# Patient Record
Sex: MALE | Race: BLACK OR AFRICAN AMERICAN | NOT HISPANIC OR LATINO | URBAN - METROPOLITAN AREA
[De-identification: names, ages, dates, MRNs, and addresses within clinical notes are randomized per-mention and may not be internally consistent; named-entity substitution may affect disease eponyms.]

---

## 2017-02-08 PROBLEM — E78.5 HYPERLIPIDEMIA: Status: ACTIVE | Noted: 2017-02-08

## 2021-10-20 ENCOUNTER — OCCUPATIONAL HEALTH (OUTPATIENT)
Dept: URGENT CARE | Facility: CLINIC | Age: 61
End: 2021-10-20

## 2021-10-20 PROCEDURE — 80305 DRUG TEST PRSMV DIR OPT OBS: CPT | Mod: S$GLB,,, | Performed by: EMERGENCY MEDICINE

## 2021-10-20 PROCEDURE — 80305 PR DRUG SCREEN - 1: ICD-10-PCS | Mod: S$GLB,,, | Performed by: EMERGENCY MEDICINE

## 2023-11-07 ENCOUNTER — TELEPHONE (OUTPATIENT)
Dept: BARIATRICS | Facility: CLINIC | Age: 63
End: 2023-11-07
Payer: COMMERCIAL

## 2023-11-07 NOTE — TELEPHONE ENCOUNTER
Finance has been notified that a patient is requesting a call to further understand why benefits does not cover bariatrics

## 2023-11-07 NOTE — TELEPHONE ENCOUNTER
----- Message from Luis Daniel Thompson sent at 11/7/2023  3:20 PM CST -----  Regarding: Wife                                                           Patient Needs Appointment       Patient: Reji Lee       Seen For:Bariatrics       Date Preferred: Soonest available       Contact Info: .[phone]        Additional Info: Wife would like for the patient to be seen soon. Please reach out to the wife as soon as possible.

## 2025-03-14 DIAGNOSIS — N52.9 MALE ERECTILE DISORDER: Primary | ICD-10-CM

## 2025-03-14 DIAGNOSIS — Z12.5 SCREENING FOR PROSTATE CANCER: ICD-10-CM

## 2025-03-22 ENCOUNTER — OFFICE VISIT (OUTPATIENT)
Dept: UROLOGY | Facility: CLINIC | Age: 65
End: 2025-03-22
Payer: COMMERCIAL

## 2025-03-22 VITALS
WEIGHT: 235.88 LBS | BODY MASS INDEX: 33.77 KG/M2 | SYSTOLIC BLOOD PRESSURE: 135 MMHG | DIASTOLIC BLOOD PRESSURE: 78 MMHG | HEIGHT: 70 IN | HEART RATE: 86 BPM

## 2025-03-22 DIAGNOSIS — R35.1 NOCTURIA: Primary | ICD-10-CM

## 2025-03-22 DIAGNOSIS — Z12.5 SCREENING FOR PROSTATE CANCER: ICD-10-CM

## 2025-03-22 DIAGNOSIS — N52.9 MALE ERECTILE DISORDER: ICD-10-CM

## 2025-03-22 PROCEDURE — 3075F SYST BP GE 130 - 139MM HG: CPT | Mod: CPTII,S$GLB,, | Performed by: STUDENT IN AN ORGANIZED HEALTH CARE EDUCATION/TRAINING PROGRAM

## 2025-03-22 PROCEDURE — 1159F MED LIST DOCD IN RCRD: CPT | Mod: CPTII,S$GLB,, | Performed by: STUDENT IN AN ORGANIZED HEALTH CARE EDUCATION/TRAINING PROGRAM

## 2025-03-22 PROCEDURE — 3008F BODY MASS INDEX DOCD: CPT | Mod: CPTII,S$GLB,, | Performed by: STUDENT IN AN ORGANIZED HEALTH CARE EDUCATION/TRAINING PROGRAM

## 2025-03-22 PROCEDURE — 99999 PR PBB SHADOW E&M-EST. PATIENT-LVL III: CPT | Mod: PBBFAC,,, | Performed by: STUDENT IN AN ORGANIZED HEALTH CARE EDUCATION/TRAINING PROGRAM

## 2025-03-22 PROCEDURE — 3078F DIAST BP <80 MM HG: CPT | Mod: CPTII,S$GLB,, | Performed by: STUDENT IN AN ORGANIZED HEALTH CARE EDUCATION/TRAINING PROGRAM

## 2025-03-22 PROCEDURE — 99204 OFFICE O/P NEW MOD 45 MIN: CPT | Mod: S$GLB,,, | Performed by: STUDENT IN AN ORGANIZED HEALTH CARE EDUCATION/TRAINING PROGRAM

## 2025-03-22 NOTE — PROGRESS NOTES
It was great to see Reji today.    Reji Lee is a pleasant 64 y.o. male presenting for management of ED and PSA surveillence.     History of Present Illness     - Patient, a 64-year-old male, was referred   for erectile dysfunction and a PSA check. He was recently prescribed Cialis (tadalafil) 10 mg for erectile dysfunction but has not yet taken it. He reports trouble with urination, specifically waking up once per night to urinate, depending on his fluid intake. He typically urinates when he wakes up at 4 AM for work. The nocturia does not significantly bother him.    - He denies any family history of prostate cancer, blood in the urine, or burning when urinating.    SOCIAL HISTORY:  - Alcohol: Drinks beer in the evenings  - Occupation: Employed    MEDICAL HISTORY:  - Erectile dysfunction      No past medical history on file.   Past Surgical History:   Procedure Laterality Date    KNEE SURGERY         ROS: as per HPI    Tobacco Use History[1]     Physical Exam     General: No acute distress. Nontoxic appearing.  HENT: Normocephalic. Atraumatic.  Respiratory: Normal respiratory effort. No conversational dyspnea. No audible wheezing.  Abdomen: No obvious distension.  Skin: No visible abnormalities.  Extremities: No edema upper extremities. No edema lower extremities.  Neurological: Alert and oriented x3. Normal speech.  Psychiatric: Normal mood. Normal affect. No evidence of SI.     Data review  Prior internal/external notes have been reviewed: Yes  Care Everywhere reviewed for external records:  Yes Labwork and PCP notes    Pertinent labs and imaging independently reviewed include:   Lab Results   Component Value Date     02/02/2017    K 4.4 02/02/2017    CREATININE 0.85 02/02/2017     Lab Results   Component Value Date    HGBA1C 5.4 02/02/2017      Lab Results   Component Value Date    PSA 1.0 09/29/2008    [facility] 0.9 05/16/2017    [facility] 1.0 08/17/2016       TEST RESULTS:  - PSA: December 2023,  0.8  - PSA: 1 week ago, 0.76     Problems addressed during today's encounter  1. Nocturia    2. Male erectile disorder  -     Ambulatory referral/consult to Urology    3. Screening for prostate cancer  -     Ambulatory referral/consult to Urology         Plan for problems listed above includes:   Assessment & Plan    ERECTILE DYSFUNCTION:   Cialis (tadalafil) 10 mg as needed, to be taken 2-3 hours before sexual activity regardless of food intake; may increase to 20 mg if 10 mg is ineffective.   Explained difference in administration timing between Viagra (30-45 minutes before on empty stomach) and Cialis (2-3 hours before, regardless of food intake).   Follow up in 3 months to assess effectiveness of erectile dysfunction treatment.    NOCTURIA:   Nocturia likely related to fluid intake habits, not indicative of serious urological issues.   Discussed nocturia and its relationship to fluid intake, particularly caffeine and alcohol consumption.   Patient to limit fluid intake 2-3 hours before bedtime to reduce nocturia.   Patient to avoid caffeine within 10 hours of bedtime.   Patient to limit alcohol intake in the evenings.    PROSTATE SCREENING:   PSA level of 0.76 from recent test is WNL, no immediate concern.   Annual PSA screening recommended given age.         Risk for nontreatment of mentioned condition(s) includes, but is not limited to:   Continuation or worsening of the current condition(s)  Impaired sexual function    Further evaluation ordered today:   None    This note was generated with the assistance of ambient listening technology. Verbal consent was obtained by the patient and accompanying visitor(s) for the recording of patient appointment to facilitate this note. I attest to having reviewed and edited the generated note for accuracy, though some syntax or spelling errors may persist. Please contact the author of this note for any clarification.     Bruno Herman MD             [1]   Social  History  Tobacco Use   Smoking Status Never   Smokeless Tobacco Not on file